# Patient Record
Sex: MALE | Race: WHITE | NOT HISPANIC OR LATINO | URBAN - METROPOLITAN AREA
[De-identification: names, ages, dates, MRNs, and addresses within clinical notes are randomized per-mention and may not be internally consistent; named-entity substitution may affect disease eponyms.]

---

## 2017-04-24 RX ORDER — CETIRIZINE HYDROCHLORIDE 10 MG/1
10 TABLET, FILM COATED ORAL
Qty: 30 | Refills: 0 | Status: ACTIVE | COMMUNITY
Start: 2017-04-24 | End: 1900-01-01

## 2017-05-29 ENCOUNTER — INPATIENT (INPATIENT)
Facility: HOSPITAL | Age: 82
LOS: 1 days | End: 2017-05-31
Attending: INTERNAL MEDICINE | Admitting: INTERNAL MEDICINE
Payer: OTHER MISCELLANEOUS

## 2017-05-29 VITALS
OXYGEN SATURATION: 94 % | SYSTOLIC BLOOD PRESSURE: 86 MMHG | WEIGHT: 151.46 LBS | RESPIRATION RATE: 22 BRPM | TEMPERATURE: 97 F | HEIGHT: 72 IN | DIASTOLIC BLOOD PRESSURE: 43 MMHG | HEART RATE: 82 BPM

## 2017-05-29 DIAGNOSIS — Z90.49 ACQUIRED ABSENCE OF OTHER SPECIFIED PARTS OF DIGESTIVE TRACT: Chronic | ICD-10-CM

## 2017-05-29 RX ORDER — POTASSIUM CHLORIDE 20 MEQ
10 PACKET (EA) ORAL DAILY
Qty: 0 | Refills: 0 | Status: DISCONTINUED | OUTPATIENT
Start: 2017-05-29 | End: 2017-05-30

## 2017-05-29 RX ORDER — SODIUM CHLORIDE 9 MG/ML
1000 INJECTION, SOLUTION INTRAVENOUS
Qty: 0 | Refills: 0 | Status: DISCONTINUED | OUTPATIENT
Start: 2017-05-29 | End: 2017-05-31

## 2017-05-29 RX ORDER — IPRATROPIUM/ALBUTEROL SULFATE 18-103MCG
3 AEROSOL WITH ADAPTER (GRAM) INHALATION EVERY 6 HOURS
Qty: 0 | Refills: 0 | Status: DISCONTINUED | OUTPATIENT
Start: 2017-05-29 | End: 2017-05-31

## 2017-05-29 RX ORDER — FUROSEMIDE 40 MG
40 TABLET ORAL DAILY
Qty: 0 | Refills: 0 | Status: DISCONTINUED | OUTPATIENT
Start: 2017-05-29 | End: 2017-05-30

## 2017-05-29 RX ORDER — ONDANSETRON 8 MG/1
4 TABLET, FILM COATED ORAL EVERY 8 HOURS
Qty: 0 | Refills: 0 | Status: DISCONTINUED | OUTPATIENT
Start: 2017-05-29 | End: 2017-05-31

## 2017-05-29 RX ADMIN — SODIUM CHLORIDE 30 MILLILITER(S): 9 INJECTION, SOLUTION INTRAVENOUS at 17:51

## 2017-05-30 DIAGNOSIS — L28.2 OTHER PRURIGO: ICD-10-CM

## 2017-05-30 DIAGNOSIS — D72.829 ELEVATED WHITE BLOOD CELL COUNT, UNSPECIFIED: ICD-10-CM

## 2017-05-30 DIAGNOSIS — R18.8 OTHER ASCITES: ICD-10-CM

## 2017-05-30 DIAGNOSIS — C18.9 MALIGNANT NEOPLASM OF COLON, UNSPECIFIED: ICD-10-CM

## 2017-05-30 DIAGNOSIS — J18.9 PNEUMONIA, UNSPECIFIED ORGANISM: ICD-10-CM

## 2017-05-30 DIAGNOSIS — J90 PLEURAL EFFUSION, NOT ELSEWHERE CLASSIFIED: ICD-10-CM

## 2017-05-30 DIAGNOSIS — I48.91 UNSPECIFIED ATRIAL FIBRILLATION: ICD-10-CM

## 2017-05-30 DIAGNOSIS — R11.2 NAUSEA WITH VOMITING, UNSPECIFIED: ICD-10-CM

## 2017-05-30 DIAGNOSIS — I50.9 HEART FAILURE, UNSPECIFIED: ICD-10-CM

## 2017-05-30 DIAGNOSIS — J44.9 CHRONIC OBSTRUCTIVE PULMONARY DISEASE, UNSPECIFIED: ICD-10-CM

## 2017-05-30 DIAGNOSIS — M10.9 GOUT, UNSPECIFIED: ICD-10-CM

## 2017-05-30 DIAGNOSIS — N19 UNSPECIFIED KIDNEY FAILURE: ICD-10-CM

## 2017-05-30 LAB
ANION GAP SERPL CALC-SCNC: 11 MMOL/L — SIGNIFICANT CHANGE UP (ref 5–17)
APPEARANCE UR: CLEAR — SIGNIFICANT CHANGE UP
BILIRUB UR-MCNC: ABNORMAL
BUN SERPL-MCNC: 57 MG/DL — HIGH (ref 7–23)
CALCIUM SERPL-MCNC: 7.8 MG/DL — LOW (ref 8.5–10.1)
CHLORIDE SERPL-SCNC: 98 MMOL/L — SIGNIFICANT CHANGE UP (ref 96–108)
CO2 SERPL-SCNC: 22 MMOL/L — SIGNIFICANT CHANGE UP (ref 22–31)
COLOR SPEC: YELLOW — SIGNIFICANT CHANGE UP
CREAT SERPL-MCNC: 2.92 MG/DL — HIGH (ref 0.5–1.3)
DIFF PNL FLD: NEGATIVE — SIGNIFICANT CHANGE UP
GLUCOSE SERPL-MCNC: 86 MG/DL — SIGNIFICANT CHANGE UP (ref 70–99)
GLUCOSE UR QL: NEGATIVE MG/DL — SIGNIFICANT CHANGE UP
HCT VFR BLD CALC: 30.9 % — LOW (ref 39–50)
HGB BLD-MCNC: 10.7 G/DL — LOW (ref 13–17)
KETONES UR-MCNC: NEGATIVE — SIGNIFICANT CHANGE UP
LEUKOCYTE ESTERASE UR-ACNC: ABNORMAL
MCHC RBC-ENTMCNC: 27 PG — SIGNIFICANT CHANGE UP (ref 27–34)
MCHC RBC-ENTMCNC: 34.6 GM/DL — SIGNIFICANT CHANGE UP (ref 32–36)
MCV RBC AUTO: 78.1 FL — LOW (ref 80–100)
NITRITE UR-MCNC: NEGATIVE — SIGNIFICANT CHANGE UP
PH UR: 5 — SIGNIFICANT CHANGE UP (ref 5–8)
PLATELET # BLD AUTO: 140 K/UL — LOW (ref 150–400)
POTASSIUM SERPL-MCNC: 5.5 MMOL/L — HIGH (ref 3.5–5.3)
POTASSIUM SERPL-SCNC: 5.5 MMOL/L — HIGH (ref 3.5–5.3)
PROT UR-MCNC: 30 MG/DL
RBC # BLD: 3.95 M/UL — LOW (ref 4.2–5.8)
RBC # FLD: 19.8 % — HIGH (ref 11–15)
RBC CASTS # UR COMP ASSIST: SIGNIFICANT CHANGE UP /HPF (ref 0–4)
SODIUM SERPL-SCNC: 131 MMOL/L — LOW (ref 135–145)
SP GR SPEC: 1.02 — SIGNIFICANT CHANGE UP (ref 1.01–1.02)
UROBILINOGEN FLD QL: 1 MG/DL
WBC # BLD: 25.2 K/UL — HIGH (ref 3.8–10.5)
WBC # FLD AUTO: 25.2 K/UL — HIGH (ref 3.8–10.5)
WBC UR QL: SIGNIFICANT CHANGE UP

## 2017-05-30 PROCEDURE — 74000: CPT | Mod: 26

## 2017-05-30 PROCEDURE — 74176 CT ABD & PELVIS W/O CONTRAST: CPT | Mod: 26

## 2017-05-30 RX ORDER — ALLOPURINOL 300 MG
100 TABLET ORAL DAILY
Qty: 0 | Refills: 0 | Status: DISCONTINUED | OUTPATIENT
Start: 2017-05-30 | End: 2017-05-30

## 2017-05-30 RX ORDER — CEFTRIAXONE 500 MG/1
INJECTION, POWDER, FOR SOLUTION INTRAMUSCULAR; INTRAVENOUS
Qty: 0 | Refills: 0 | Status: DISCONTINUED | OUTPATIENT
Start: 2017-05-30 | End: 2017-05-31

## 2017-05-30 RX ORDER — CEFTRIAXONE 500 MG/1
1 INJECTION, POWDER, FOR SOLUTION INTRAMUSCULAR; INTRAVENOUS ONCE
Qty: 0 | Refills: 0 | Status: COMPLETED | OUTPATIENT
Start: 2017-05-30 | End: 2017-05-30

## 2017-05-30 RX ORDER — PANTOPRAZOLE SODIUM 20 MG/1
40 TABLET, DELAYED RELEASE ORAL DAILY
Qty: 0 | Refills: 0 | Status: DISCONTINUED | OUTPATIENT
Start: 2017-05-30 | End: 2017-05-31

## 2017-05-30 RX ORDER — FUROSEMIDE 40 MG
20 TABLET ORAL ONCE
Qty: 0 | Refills: 0 | Status: COMPLETED | OUTPATIENT
Start: 2017-05-30 | End: 2017-05-30

## 2017-05-30 RX ORDER — ALLOPURINOL 300 MG
300 TABLET ORAL DAILY
Qty: 0 | Refills: 0 | Status: DISCONTINUED | OUTPATIENT
Start: 2017-05-30 | End: 2017-05-30

## 2017-05-30 RX ORDER — FUROSEMIDE 40 MG
20 TABLET ORAL DAILY
Qty: 0 | Refills: 0 | Status: DISCONTINUED | OUTPATIENT
Start: 2017-05-30 | End: 2017-05-31

## 2017-05-30 RX ORDER — DEXAMETHASONE 0.5 MG/5ML
4 ELIXIR ORAL
Qty: 0 | Refills: 0 | Status: DISCONTINUED | OUTPATIENT
Start: 2017-05-30 | End: 2017-05-31

## 2017-05-30 RX ORDER — CEFTRIAXONE 500 MG/1
1 INJECTION, POWDER, FOR SOLUTION INTRAMUSCULAR; INTRAVENOUS EVERY 24 HOURS
Qty: 0 | Refills: 0 | Status: DISCONTINUED | OUTPATIENT
Start: 2017-05-31 | End: 2017-05-31

## 2017-05-30 RX ORDER — HYDROMORPHONE HYDROCHLORIDE 2 MG/ML
0.5 INJECTION INTRAMUSCULAR; INTRAVENOUS; SUBCUTANEOUS EVERY 4 HOURS
Qty: 0 | Refills: 0 | Status: DISCONTINUED | OUTPATIENT
Start: 2017-05-30 | End: 2017-05-31

## 2017-05-30 RX ADMIN — Medication 20 MILLIGRAM(S): at 06:17

## 2017-05-30 RX ADMIN — Medication 4 MILLIGRAM(S): at 17:12

## 2017-05-30 RX ADMIN — CEFTRIAXONE 100 GRAM(S): 500 INJECTION, POWDER, FOR SOLUTION INTRAMUSCULAR; INTRAVENOUS at 16:47

## 2017-05-30 RX ADMIN — Medication 100 MILLIGRAM(S): at 11:28

## 2017-05-30 RX ADMIN — Medication 10 MILLIEQUIVALENT(S): at 11:28

## 2017-05-30 RX ADMIN — HYDROMORPHONE HYDROCHLORIDE 0.5 MILLIGRAM(S): 2 INJECTION INTRAMUSCULAR; INTRAVENOUS; SUBCUTANEOUS at 23:37

## 2017-05-30 RX ADMIN — HYDROMORPHONE HYDROCHLORIDE 0.5 MILLIGRAM(S): 2 INJECTION INTRAMUSCULAR; INTRAVENOUS; SUBCUTANEOUS at 23:59

## 2017-05-30 RX ADMIN — Medication 0.5 MILLIGRAM(S): at 14:13

## 2017-05-30 NOTE — H&P ADULT - PMH
Atrial fibrillation    CAD (coronary artery disease)    CHF (congestive heart failure)    Colon cancer    COPD (chronic obstructive pulmonary disease)    Gout    HTN (hypertension)    Pleural effusion

## 2017-05-30 NOTE — H&P ADULT - NSHPREVIEWOFSYSTEMS_GEN_ALL_CORE
irregular heart beat  diminished breath sounds at bases abdomen distended with pleurex catheterr in place extremities some edema

## 2017-05-30 NOTE — PROGRESS NOTE ADULT - SUBJECTIVE AND OBJECTIVE BOX
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  Slow decline in condition and admission advised because of declining condition. (30 May 2017 07:24)          PPSV2:     Code Status:          MEDICATIONS  (STANDING):  dextrose 5% + sodium chloride 0.45%. 1000milliLiter(s) IV Continuous <Continuous>  furosemide   Injectable 20milliGRAM(s) IV Push daily  dexamethasone  Injectable 4milliGRAM(s) IV Push two times a day  pantoprazole  Injectable 40milliGRAM(s) IV Push daily    MEDICATIONS  (PRN):  bisacodyl Suppository 10milliGRAM(s) Rectal daily PRN Constipation  ALBUTerol/ipratropium for Nebulization 3milliLiter(s) Nebulizer every 6 hours PRN Shortness of Breath and/or Wheezing  ondansetron Injectable 4milliGRAM(s) IV Push every 8 hours PRN Nausea and/or Vomiting  HYDROmorphone  Injectable 0.5milliGRAM(s) IV Push every 4 hours PRN Moderate Pain (4 - 6)      CBC Full  -  ( 30 May 2017 06:28 )  WBC Count : 25.2 K/uL  Hemoglobin : 10.7 g/dL  Hematocrit : 30.9 %  Platelet Count - Automated : 140 K/uL  Mean Cell Volume : 78.1 fl  Mean Cell Hemoglobin : 27.0 pg  Mean Cell Hemoglobin Concentration : 34.6 gm/dL  Auto Neutrophil # : x  Auto Lymphocyte # : x  Auto Monocyte # : x  Auto Eosinophil # : x  Auto Basophil # : x  Auto Neutrophil % : x  Auto Lymphocyte % : x  Auto Monocyte % : x  Auto Eosinophil % : x  Auto Basophil % : x                         Vital Signs Last 24 Hrs  T(C): 36.6, Max: 36.6 (05-30 @ 07:40)  T(F): 97.8, Max: 97.9 (05-30 @ 07:40)  HR: 108 (82 - 119)  BP: 75/43 (75/43 - 107/51)  BP(mean): --  RR: 18 (18 - 22)  SpO2: 100% (90% - 100%)    PHYSICAL EXAM:      Constitutional:    Eyes:    ENMT:    Neck:    Breasts:    Back:    Respiratory:    Cardiovascular:    Gastrointestinal:    Genitourinary:    Rectal:    Extremities:    Vascular:    Neurological:    Skin:    Lymph Nodes:    Musculoskeletal:    Psychiatric: SHELLIE DORYS                    85y  Male    Allergies    No Known Allergies    Intolerances  morphine (Nausea)      Symptoms:  Pain (1-10): chest and upper abdomen, pt also says this is itchy-unable to quantify or describe the pain  Dyspnea: yes, + accessory muscle use  Nausea/Vomiting: not since arrival to [facility]  Secretions:  no  Agitation: no  Symptom Requiring Inpatient Hospice Admission: nausea, vomiting and abdominal pain at home    Overnight events/interim history:  Pt having no nausea or vomiting since admission.  His chief c/o is pain and itching over his chest and arms. Rash on chest and arms-per wife approx 1 mos "We have tried 100 creams and pills, nothing helps".    Labs show elevated WBC, creat 2.97.   AXR: "The stomach is distended with air. There are also multiple   dilated air-filled loops of colon which may be due to obstruction or   ileus. There is normal overall paucity of small bowel gas. A catheter is   coiled over the mid abdomen. 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  Slow decline in condition and admission advised because of declining condition. (30 May 2017 07:24)          PPSV2: 30%    Code Status:  DNR          MEDICATIONS  (STANDING):  dextrose 5% + sodium chloride 0.45%. 1000milliLiter(s) IV Continuous <Continuous>  furosemide   Injectable 20milliGRAM(s) IV Push daily  dexamethasone  Injectable 4milliGRAM(s) IV Push two times a day  pantoprazole  Injectable 40milliGRAM(s) IV Push daily    MEDICATIONS  (PRN):  bisacodyl Suppository 10milliGRAM(s) Rectal daily PRN Constipation  ALBUTerol/ipratropium for Nebulization 3milliLiter(s) Nebulizer every 6 hours PRN Shortness of Breath and/or Wheezing  ondansetron Injectable 4milliGRAM(s) IV Push every 8 hours PRN Nausea and/or Vomiting  HYDROmorphone  Injectable 0.5milliGRAM(s) IV Push every 4 hours PRN Moderate Pain (4 - 6)      CBC Full  -  ( 30 May 2017 06:28 )  WBC Count : 25.2 K/uL  Hemoglobin : 10.7 g/dL  Hematocrit : 30.9 %  Platelet Count - Automated : 140 K/uL  Mean Cell Volume : 78.1 fl  Mean Cell Hemoglobin : 27.0 pg  Mean Cell Hemoglobin Concentration : 34.6 gm/dL  Auto Neutrophil # : x  Auto Lymphocyte # : x  Auto Monocyte # : x  Auto Eosinophil # : x  Auto Basophil # : x  Auto Neutrophil % : x  Auto Lymphocyte % : x  Auto Monocyte % : x  Auto Eosinophil % : x  Auto Basophil % : x                         30 May 2017 06:28    131    |  98     |  57     ----------------------------<  86     5.5     |  22     |  2.92     Ca    7.8        30         May 2017 06:28        Vital Signs Last 24 Hrs  T(C): 36.6, Max: 36.6 (05-30 @ 07:40)  T(F): 97.8, Max: 97.9 (05-30 @ 07:40)  HR: 108 (82 - 119)  BP: 75/43 (75/43 - 107/51)  BP(mean): --  RR: 18 (18 - 22)  SpO2: 100% (90% - 100%)    PHYSICAL EXAM:      Constitutional:   pale, cachectic,     HEENT: anicteric, EOMI, PERRL, edentulous, mucous membranes moist    Neck:  no JVd    Respiratory:  coarse rhonchi bilat , + accessory muscle use    Cardiovascular: Reg S1S2, , hypotensive    Gastrointestinal: abd soft, BS hypoactive, +tenderness to deep palpation    Genitourinary:    Rectal:    Extremities: no edema    Vascular:  DP pulses palp and strong    Neurological: alert, irritable, oriented to person and place    Skin: diffuse pruritic, sparse, discreet, maculopapular rash on arms, shoulders, and chest, no vesicles, no base, SHELLIE DORYS                    85y  Male    Allergies    No Known Allergies    Intolerances  morphine (Nausea)      Symptoms:  Pain (1-10): chest and upper abdomen, pt also says this is itchy-unable to quantify or describe the pain  Dyspnea: yes, + accessory muscle use  Nausea/Vomiting: not since arrival to [facility]  Secretions:  no  Agitation: no  Symptom Requiring Inpatient Hospice Admission: nausea, vomiting and abdominal pain at home    Overnight events/interim history:  Pt having no nausea or vomiting since admission.  His chief c/o is pain and itching over his chest and arms. Rash on chest and arms-per wife approx 1 mos "We have tried 100 creams and pills, nothing helps".    Labs show elevated WBC, creat 2.97.   AXR: "The stomach is distended with air. There are also multiple   dilated air-filled loops of colon which may be due to obstruction or   ileus. There is normal overall paucity of small bowel gas. A catheter is   coiled over the mid abdomen. 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  Slow decline in condition and admission advised because of declining condition. (30 May 2017 07:24)          PPSV2: 30%    Code Status:  DNR          MEDICATIONS  (STANDING):  dextrose 5% + sodium chloride 0.45%. 1000milliLiter(s) IV Continuous <Continuous>  furosemide   Injectable 20milliGRAM(s) IV Push daily  dexamethasone  Injectable 4milliGRAM(s) IV Push two times a day  pantoprazole  Injectable 40milliGRAM(s) IV Push daily    MEDICATIONS  (PRN):  bisacodyl Suppository 10milliGRAM(s) Rectal daily PRN Constipation  ALBUTerol/ipratropium for Nebulization 3milliLiter(s) Nebulizer every 6 hours PRN Shortness of Breath and/or Wheezing  ondansetron Injectable 4milliGRAM(s) IV Push every 8 hours PRN Nausea and/or Vomiting  HYDROmorphone  Injectable 0.5milliGRAM(s) IV Push every 4 hours PRN Moderate Pain (4 - 6)      CBC Full  -  ( 30 May 2017 06:28 )  WBC Count : 25.2 K/uL  Hemoglobin : 10.7 g/dL  Hematocrit : 30.9 %  Platelet Count - Automated : 140 K/uL  Mean Cell Volume : 78.1 fl  Mean Cell Hemoglobin : 27.0 pg  Mean Cell Hemoglobin Concentration : 34.6 gm/dL  Auto Neutrophil # : x  Auto Lymphocyte # : x  Auto Monocyte # : x  Auto Eosinophil # : x  Auto Basophil # : x  Auto Neutrophil % : x  Auto Lymphocyte % : x  Auto Monocyte % : x  Auto Eosinophil % : x  Auto Basophil % : x                         30 May 2017 06:28    131    |  98     |  57     ----------------------------<  86     5.5     |  22     |  2.92     Ca    7.8        30         May 2017 06:28        Vital Signs Last 24 Hrs  T(C): 36.6, Max: 36.6 (05-30 @ 07:40)  T(F): 97.8, Max: 97.9 (05-30 @ 07:40)  HR: 108 (82 - 119)  BP: 75/43 (75/43 - 107/51)  BP(mean): --  RR: 18 (18 - 22)  SpO2: 100% (90% - 100%)    PHYSICAL EXAM:      Constitutional:   pale, cachectic,     HEENT: anicteric, EOMI, PERRL, edentulous, mucous membranes moist    Neck:  no JVd    Respiratory:  coarse rhonchi bilat , + accessory muscle use    Cardiovascular: Reg S1S2, , hypotensive    Gastrointestinal: abd soft, BS hypoactive, +tenderness to deep palpation    Extremities: no edema    Vascular:  DP pulses palp and strong    Neurological: alert, irritable, oriented to person and place    Skin: diffuse pruritic, sparse, discreet, maculopapular rash on arms, shoulders, and chest, no vesicles, no base,

## 2017-05-30 NOTE — PROGRESS NOTE ADULT - PROBLEM SELECTOR PLAN 5
renal dosing of meds Has tunneled catheter for daily drainage  Hold drainage with parameter SBP must be greater than 100.

## 2017-05-30 NOTE — PROGRESS NOTE ADULT - PROBLEM SELECTOR PLAN 2
Afebrile  R/O urinary or ascites fluid source  start IV abx with Rocephin Ct shows liklely PNA  LLL  Rocephin started  CBC AM

## 2017-05-30 NOTE — PROGRESS NOTE ADULT - PROBLEM SELECTOR PLAN 4
Has tunneled catheter for daily drainage  Hold drainage with parameter SBP must be greater than 100. Advise NPO status but wife refuses and feels strongly for him to have jello which he tolerated a cup of.  CT abd justine contrast  ondansetron PRN  Dexamethasone IV with protonix  IVF at 30/hr

## 2017-05-30 NOTE — PROGRESS NOTE ADULT - PROBLEM SELECTOR PLAN 1
GIP level of care for sx management in pt with greatly diminished PO intake and decline in clinical and functional status which has accelerated over past 3-4 days.  Requesting son to come tomorrow for len Shaw who has agreed to come for 9:30 am tomorrow. GIP level of care for sx management in pt with greatly diminished PO intake and decline in clinical and functional status which has accelerated over past 3-4 days.  Requesting son to come tomorrow for family meeting Colin who has agreed to come for 9:30 am tomorrow.  Results of CT reviewed with wife

## 2017-05-30 NOTE — H&P ADULT - PSH
H/O right hemicolectomy  June 2014 - Lap assisted extended right hemicolectomy, extensive lysis of adhesions, segmental small bowel resection  Renal calculi  s/p laser surgery  S/P angioplasty with stent    S/P appendectomy    S/P hernia repair    S/p nephrectomy

## 2017-05-30 NOTE — PROGRESS NOTE ADULT - PROBLEM SELECTOR PLAN 3
Advise NPO status but wife refuses and feels strongly for him to have jello which he tolerated a cup of.  CT abd justine contrast  ondansetron PRN  Dexamethasone IV with protonix  IVF at 30/hr Afebrile  likely PNA  start IV abx with Rocephin

## 2017-05-30 NOTE — H&P ADULT - NSHPPHYSICALEXAM_GEN_ALL_CORE
frail temporal wasting mucus membranes dry neck supple heart irregular lungs diminished breath sounds at bases abdomen distended with in dwelling catheter in place extremities some edema

## 2017-05-31 VITALS
OXYGEN SATURATION: 99 % | SYSTOLIC BLOOD PRESSURE: 107 MMHG | HEART RATE: 119 BPM | DIASTOLIC BLOOD PRESSURE: 53 MMHG | TEMPERATURE: 98 F | RESPIRATION RATE: 16 BRPM

## 2017-05-31 LAB
HCT VFR BLD CALC: 31.1 % — LOW (ref 39–50)
HGB BLD-MCNC: 10.6 G/DL — LOW (ref 13–17)
MCHC RBC-ENTMCNC: 26.6 PG — LOW (ref 27–34)
MCHC RBC-ENTMCNC: 34.3 GM/DL — SIGNIFICANT CHANGE UP (ref 32–36)
MCV RBC AUTO: 77.5 FL — LOW (ref 80–100)
PLATELET # BLD AUTO: 194 K/UL — SIGNIFICANT CHANGE UP (ref 150–400)
RBC # BLD: 4.01 M/UL — LOW (ref 4.2–5.8)
RBC # FLD: 19.2 % — HIGH (ref 11–15)
WBC # BLD: 23.1 K/UL — HIGH (ref 3.8–10.5)
WBC # FLD AUTO: 23.1 K/UL — HIGH (ref 3.8–10.5)

## 2017-05-31 RX ADMIN — HYDROMORPHONE HYDROCHLORIDE 0.5 MILLIGRAM(S): 2 INJECTION INTRAMUSCULAR; INTRAVENOUS; SUBCUTANEOUS at 06:51

## 2017-05-31 RX ADMIN — Medication 4 MILLIGRAM(S): at 05:49

## 2017-05-31 NOTE — DISCHARGE NOTE FOR THE EXPIRED PATIENT - HOSPITAL COURSE
Patient with metastatic colon cancer to liver and lung entered with elevated WBC and presumed aspiration pneumonia with progressive metastatic disease

## 2017-05-31 NOTE — PROGRESS NOTE ADULT - SUBJECTIVE AND OBJECTIVE BOX
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  Slow decline in condition and admission advised because of declining condition. (30 May 2017 07:24)          Last 24 hours: declining raqpidly with rhonchi    MEDICATIONS  (STANDING):  dextrose 5% + sodium chloride 0.45%. 1000milliLiter(s) IV Continuous <Continuous>  furosemide   Injectable 20milliGRAM(s) IV Push daily  dexamethasone  Injectable 4milliGRAM(s) IV Push two times a day  pantoprazole  Injectable 40milliGRAM(s) IV Push daily  cefTRIAXone   IVPB  IV Intermittent   cefTRIAXone   IVPB 1Gram(s) IV Intermittent every 24 hours    MEDICATIONS  (PRN):  bisacodyl Suppository 10milliGRAM(s) Rectal daily PRN Constipation  ALBUTerol/ipratropium for Nebulization 3milliLiter(s) Nebulizer every 6 hours PRN Shortness of Breath and/or Wheezing  ondansetron Injectable 4milliGRAM(s) IV Push every 8 hours PRN Nausea and/or Vomiting  HYDROmorphone  Injectable 0.5milliGRAM(s) IV Push every 4 hours PRN Moderate Pain (4 - 6)        CBC Full  -  ( 31 May 2017 06:46 )  WBC Count : 23.1 K/uL  Hemoglobin : 10.6 g/dL  Hematocrit : 31.1 %  Platelet Count - Automated : 194 K/uL  Mean Cell Volume : 77.5 fl  Mean Cell Hemoglobin : 26.6 pg  Mean Cell Hemoglobin Concentration : 34.3 gm/dL  Auto Neutrophil # : x  Auto Lymphocyte # : x  Auto Monocyte # : x  Auto Eosinophil # : x  Auto Basophil # : x  Auto Neutrophil % : x  Auto Lymphocyte % : x  Auto Monocyte % : x  Auto Eosinophil % : x  Auto Basophil % : x               30 May 2017 06:28    131    |  98     |  57     ----------------------------<  86     5.5     |  22     |  2.92     Ca    7.8        30 May 2017 06:28                 PHYSICAL EXAM:      Constitutional: frail gurgling    Eyes: shut    ENMT: vomiting blood    Neck: supple    Breasts: not examined    Back: reddened    Respiratory: coarse rhonchi bilaterally    Cardiovascular:  tachycardic    Gastrointestinal: abdomen firmly distended    Genitourinary:incontinent    Rectal: not examined    Extremities: some edema    Vascular: not evaluated    Neurological: barely responsive    Skin: clammy    Lymph Nodes: none palpable    Musculoskeletal: no strength    Psychiatric:  NA

## 2017-06-07 DIAGNOSIS — R57.9 SHOCK, UNSPECIFIED: ICD-10-CM

## 2017-06-07 DIAGNOSIS — I11.0 HYPERTENSIVE HEART DISEASE WITH HEART FAILURE: ICD-10-CM

## 2017-06-07 DIAGNOSIS — Z90.49 ACQUIRED ABSENCE OF OTHER SPECIFIED PARTS OF DIGESTIVE TRACT: ICD-10-CM

## 2017-06-07 DIAGNOSIS — Z87.898 PERSONAL HISTORY OF OTHER SPECIFIED CONDITIONS: ICD-10-CM

## 2017-06-07 DIAGNOSIS — N19 UNSPECIFIED KIDNEY FAILURE: ICD-10-CM

## 2017-06-07 DIAGNOSIS — Z82.49 FAMILY HISTORY OF ISCHEMIC HEART DISEASE AND OTHER DISEASES OF THE CIRCULATORY SYSTEM: ICD-10-CM

## 2017-06-07 DIAGNOSIS — Z79.891 LONG TERM (CURRENT) USE OF OPIATE ANALGESIC: ICD-10-CM

## 2017-06-07 DIAGNOSIS — Z66 DO NOT RESUSCITATE: ICD-10-CM

## 2017-06-07 DIAGNOSIS — A41.9 SEPSIS, UNSPECIFIED ORGANISM: ICD-10-CM

## 2017-06-07 DIAGNOSIS — I48.91 UNSPECIFIED ATRIAL FIBRILLATION: ICD-10-CM

## 2017-06-07 DIAGNOSIS — M10.9 GOUT, UNSPECIFIED: ICD-10-CM

## 2017-06-07 DIAGNOSIS — J44.9 CHRONIC OBSTRUCTIVE PULMONARY DISEASE, UNSPECIFIED: ICD-10-CM

## 2017-06-07 DIAGNOSIS — G89.3 NEOPLASM RELATED PAIN (ACUTE) (CHRONIC): ICD-10-CM

## 2017-06-07 DIAGNOSIS — C78.7 SECONDARY MALIGNANT NEOPLASM OF LIVER AND INTRAHEPATIC BILE DUCT: ICD-10-CM

## 2017-06-07 DIAGNOSIS — I50.9 HEART FAILURE, UNSPECIFIED: ICD-10-CM

## 2017-06-07 DIAGNOSIS — C78.00 SECONDARY MALIGNANT NEOPLASM OF UNSPECIFIED LUNG: ICD-10-CM

## 2017-06-07 DIAGNOSIS — J69.0 PNEUMONITIS DUE TO INHALATION OF FOOD AND VOMIT: ICD-10-CM

## 2017-06-07 DIAGNOSIS — R06.00 DYSPNEA, UNSPECIFIED: ICD-10-CM

## 2017-06-07 DIAGNOSIS — R18.8 OTHER ASCITES: ICD-10-CM

## 2017-06-07 DIAGNOSIS — L29.9 PRURITUS, UNSPECIFIED: ICD-10-CM

## 2017-06-07 DIAGNOSIS — Z88.5 ALLERGY STATUS TO NARCOTIC AGENT: ICD-10-CM

## 2017-06-07 DIAGNOSIS — C18.9 MALIGNANT NEOPLASM OF COLON, UNSPECIFIED: ICD-10-CM

## 2017-06-07 DIAGNOSIS — I25.10 ATHEROSCLEROTIC HEART DISEASE OF NATIVE CORONARY ARTERY WITHOUT ANGINA PECTORIS: ICD-10-CM

## 2017-06-07 DIAGNOSIS — I95.9 HYPOTENSION, UNSPECIFIED: ICD-10-CM
